# Patient Record
Sex: FEMALE | Race: WHITE | NOT HISPANIC OR LATINO | URBAN - METROPOLITAN AREA
[De-identification: names, ages, dates, MRNs, and addresses within clinical notes are randomized per-mention and may not be internally consistent; named-entity substitution may affect disease eponyms.]

---

## 2022-10-10 ENCOUNTER — DOCUMENTATION (OUTPATIENT)
Dept: PSYCHIATRY | Facility: CLINIC | Age: 57
End: 2022-10-10

## 2022-10-26 ENCOUNTER — OFFICE VISIT (OUTPATIENT)
Dept: PSYCHIATRY | Facility: CLINIC | Age: 57
End: 2022-10-26

## 2022-10-26 DIAGNOSIS — F41.1 GAD (GENERALIZED ANXIETY DISORDER): ICD-10-CM

## 2022-10-26 DIAGNOSIS — F31.9 BIPOLAR DISORDER WITH DEPRESSION (HCC): Primary | ICD-10-CM

## 2022-10-26 DIAGNOSIS — Z63.4 BEREAVEMENT WITHOUT COMPLICATION: ICD-10-CM

## 2022-10-26 RX ORDER — CARBAMAZEPINE 200 MG/1
200 TABLET ORAL 2 TIMES DAILY
Qty: 180 TABLET | Refills: 0 | Status: SHIPPED | OUTPATIENT
Start: 2022-10-26

## 2022-10-26 RX ORDER — CLONAZEPAM 0.5 MG/1
0.5 TABLET ORAL 3 TIMES DAILY
Qty: 90 TABLET | Refills: 0 | Status: SHIPPED | OUTPATIENT
Start: 2022-10-26

## 2022-10-26 RX ORDER — ARIPIPRAZOLE 30 MG/1
30 TABLET ORAL DAILY
Qty: 90 TABLET | Refills: 0 | Status: SHIPPED | OUTPATIENT
Start: 2022-10-26

## 2022-10-26 RX ORDER — TEMAZEPAM 30 MG/1
30 CAPSULE ORAL
Qty: 30 CAPSULE | Refills: 0 | Status: SHIPPED | OUTPATIENT
Start: 2022-10-26

## 2022-10-26 RX ORDER — MIRTAZAPINE 30 MG/1
30 TABLET, FILM COATED ORAL
Qty: 90 TABLET | Refills: 0 | Status: SHIPPED | OUTPATIENT
Start: 2022-10-26

## 2022-10-26 RX ORDER — DOXEPIN HYDROCHLORIDE 100 MG/1
CAPSULE ORAL
Qty: 180 CAPSULE | Refills: 1 | Status: SHIPPED | OUTPATIENT
Start: 2022-10-26

## 2022-10-26 SDOH — SOCIAL STABILITY - SOCIAL INSECURITY: DISSAPEARANCE AND DEATH OF FAMILY MEMBER: Z63.4

## 2022-10-26 NOTE — PSYCH
Psychiatric Medication Management - [address] [facility] 61 y o  female MRN: [id number]          This note was not shared with the patient due to reasonable likelihood of causing patient harm     Reason for Visit: depression, anxiety and insomnia    Subjective: Former pt of Dr Ulises Echols and treated for Bipolar Delmi, anxiety and insomnia  Depression since 1984 and anxiety " all my life " Pt reports stable mood with good energy and good motivation  Enjoys going to [employer] and active in her [place]  She also enjoys crocheting, shopping and playing with her cat  Denies SI/HI  Anxiety is mild and manageable  Tolerates medications well  Denies elevated mood      view Of Systems:     Constitutional Negative   ENT Negative    Cardiovascular High cholesterol   Respiratory Emphysema   Gastrointestinal Negative   Genitourinary Overactive bladder   Musculoskeletal Negative   Integumentary Negative   Neurological headaches   Endocrine IDDM type 2       Allergies: PCN, Amoxicillin, Benadryl    Past Psychiatric History: Dr Ulises Echols    Family Psychiatric History: anxiety, bipolar delmi    Social History: , lost 28 yo son 3 years ago    Substance Abuse History: pt denies     Traumatic History: loss of son 3 years ago    The following portions of the patient's history were reviewed and updated as appropriate: past family history, past medical history, past social history, past surgical history and problem list       Mental status:  Appearance Casually dressed   Mood stable   Affect pleasant   Speech WNL   Thought Processes WNL   Associations intact   Hallucinations Denies any auditory or visual hallucinations   Thought Content No passive or active suicidal or homicidal ideation, intent, or plan   Orientation Oriented to person, place, time, and situation   Recent and Remote Memory Grossly intact   Attention Span and Concentration intact   Intellect Appears to be of Average Intelligence   Insight intact   Judgement judgment was intact   Muscle Strength Normal gait   Language Within normal limits   Fund of Knowledge Age appropriate   Pain NA            ? Assessment/Plan: stable mood/ continue current medications: Abilify 30 mg daily  Tegretol 200 mg bid, Doxepin 200 mg hs, Klonopin 0 5 mg tid prn, Remeron 30 mg hs and Restoril 30 mg hs prn     Diagnosis: Bipolar delmi, depressed, PABLO, Bereavement    Risks, Benefits And Possible Side Effects Of Medications:  Risks, benefits, and possible side effects of medications explained to patient and family, they verbalize understanding    Controlled Medication Discussion: Discussed with patient Black Box warning on concurrent use of benzodiazepines and opioid medications including sedation, respiratory depression, coma and death  Patient understands the risk of treatment with benzodiazepines in addition to opioids and wants to continue taking those medications  Psychotherapy Provided: Supportive psychotherapy provided  Yes  Reassurance and supportive therapy provided         Visit Time    Visit Start Time: 2:35 pm  Visit Stop Time:  3:00 pm  Total Visit Duration: 25 min

## 2022-11-22 DIAGNOSIS — F41.1 GAD (GENERALIZED ANXIETY DISORDER): ICD-10-CM

## 2022-11-28 DIAGNOSIS — F41.1 GAD (GENERALIZED ANXIETY DISORDER): ICD-10-CM

## 2022-11-28 NOTE — TELEPHONE ENCOUNTER
Medication Refill Request     Name of Medication Temazepam   Dose/Frequency 30 mg 1 x daily   Quantity 30  Verified pharmacy   [x]  Verified ordering Provider   [x]  Does patient have enough for the next 3 days? Yes [] No [x]  Does patient have a follow-up appointment scheduled?  Yes [x] No []   If so when is appointment: 12/13/22

## 2022-11-30 RX ORDER — TEMAZEPAM 30 MG/1
30 CAPSULE ORAL
Qty: 30 CAPSULE | Refills: 0 | Status: SHIPPED | OUTPATIENT
Start: 2022-11-30 | End: 2022-12-11 | Stop reason: SDUPTHER

## 2022-12-06 RX ORDER — CLONAZEPAM 0.5 MG/1
TABLET ORAL
Qty: 90 TABLET | Refills: 0 | Status: SHIPPED | OUTPATIENT
Start: 2022-12-06 | End: 2022-12-13 | Stop reason: SDUPTHER

## 2022-12-13 ENCOUNTER — OFFICE VISIT (OUTPATIENT)
Dept: PSYCHIATRY | Facility: CLINIC | Age: 57
End: 2022-12-13

## 2022-12-13 DIAGNOSIS — F31.9 BIPOLAR DISORDER WITH DEPRESSION (HCC): ICD-10-CM

## 2022-12-13 DIAGNOSIS — Z63.4 BEREAVEMENT WITHOUT COMPLICATION: Primary | ICD-10-CM

## 2022-12-13 DIAGNOSIS — F41.1 GAD (GENERALIZED ANXIETY DISORDER): ICD-10-CM

## 2022-12-13 RX ORDER — DOXEPIN HYDROCHLORIDE 100 MG/1
CAPSULE ORAL
Qty: 180 CAPSULE | Refills: 0 | Status: SHIPPED | OUTPATIENT
Start: 2022-12-13

## 2022-12-13 RX ORDER — TEMAZEPAM 30 MG/1
30 CAPSULE ORAL
Qty: 30 CAPSULE | Refills: 1 | Status: SHIPPED | OUTPATIENT
Start: 2022-12-25

## 2022-12-13 RX ORDER — CARBAMAZEPINE 200 MG/1
200 TABLET ORAL 2 TIMES DAILY
Qty: 180 TABLET | Refills: 0 | Status: SHIPPED | OUTPATIENT
Start: 2022-12-13

## 2022-12-13 RX ORDER — CLONAZEPAM 0.5 MG/1
0.5 TABLET ORAL 3 TIMES DAILY
Qty: 90 TABLET | Refills: 1 | Status: SHIPPED | OUTPATIENT
Start: 2022-12-31

## 2022-12-13 RX ORDER — ARIPIPRAZOLE 30 MG/1
30 TABLET ORAL DAILY
Qty: 90 TABLET | Refills: 0 | Status: SHIPPED | OUTPATIENT
Start: 2022-12-13

## 2022-12-13 RX ORDER — MIRTAZAPINE 30 MG/1
30 TABLET, FILM COATED ORAL
Qty: 90 TABLET | Refills: 0 | Status: SHIPPED | OUTPATIENT
Start: 2022-12-13

## 2022-12-13 SDOH — SOCIAL STABILITY - SOCIAL INSECURITY: DISSAPEARANCE AND DEATH OF FAMILY MEMBER: Z63.4

## 2022-12-13 NOTE — PSYCH
Psychiatric Medication Management - [address] [facility] 61 y o  female MRN: [id number]          This note was not shared with the patient due to reasonable likelihood of causing patient harm     Reason for Visit: depression, anxiety and insomnia    Subjective: Holiday season difficult for pt since she misses family members who passed away ( mother, son)  Father lives in [place]  Spent Thanksgiving with , good friend and a neighbor  Became tearful speaking about  family member  Former pt of Dr Bruce Cook and treated for Bipolar Delmi, anxiety and insomnia  Pt reports stable mood with good energy and good motivation  " I feel fine " " I go to [place] every  " Enjoys going to [employer] and active in her [place]  She also enjoys crocheting, shopping and playing with her cat  Denies SI/HI  Anxiety is mild and manageable  Tolerates medications well  Denies elevated mood  Overall stable       view Of Systems:     Constitutional Negative   ENT Negative    Cardiovascular High cholesterol   Respiratory Emphysema   Gastrointestinal Negative   Genitourinary Overactive bladder   Musculoskeletal Negative   Integumentary Negative   Neurological headaches   Endocrine IDDM type 2       Allergies: PCN, Amoxicillin, Benadryl    Past Psychiatric History: Dr Bruce Cook    Family Psychiatric History: anxiety, bipolar delmi    Social History: , lost 30 yo son 3 years ago    Substance Abuse History: pt denies     Traumatic History: loss of son 3 years ago    The following portions of the patient's history were reviewed and updated as appropriate: past family history, past medical history, past social history, past surgical history and problem list       Mental status:  Appearance Casually dressed   Mood Sadness, situational   Affect pleasant   Speech WNL   Thought Processes WNL   Associations intact   Hallucinations Denies any auditory or visual hallucinations   Thought Content No passive or active suicidal or homicidal ideation, intent, or plan   Orientation Oriented to person, place, time, and situation   Recent and Remote Memory Grossly intact   Attention Span and Concentration intact   Intellect Appears to be of Average Intelligence   Insight intact   Judgement judgment was intact   Muscle Strength Normal gait   Language Within normal limits   Fund of Knowledge Age appropriate   Pain NA            ? Assessment/Plan: stable mood/ continue current medications: Abilify 30 mg daily, Tegretol 200 mg bid, Doxepin 200 mg hs, Klonopin 0 5 mg tid prn, Remeron 30 mg hs and Restoril 30 mg hs prn  Follow up in 8 weeks  Diagnosis: Bipolar delmi, depressed, PABLO, Bereavement    Risks, Benefits And Possible Side Effects Of Medications:  Risks, benefits, and possible side effects of medications explained to patient and family, they verbalize understanding    Controlled Medication Discussion: Discussed with patient Black Box warning on concurrent use of benzodiazepines and opioid medications including sedation, respiratory depression, coma and death  Patient understands the risk of treatment with benzodiazepines in addition to opioids and wants to continue taking those medications  Psychotherapy Provided: Supportive psychotherapy provided  Yes  Reassurance and supportive therapy provided         Visit Time    Visit Start Time: 13:20  Visit Stop Time:  13:40  Total Visit Duration: 20 min

## 2023-02-07 ENCOUNTER — OFFICE VISIT (OUTPATIENT)
Dept: PSYCHIATRY | Facility: CLINIC | Age: 58
End: 2023-02-07

## 2023-02-07 DIAGNOSIS — F31.9 BIPOLAR DISORDER WITH DEPRESSION (HCC): Primary | ICD-10-CM

## 2023-02-07 DIAGNOSIS — F41.1 GAD (GENERALIZED ANXIETY DISORDER): ICD-10-CM

## 2023-02-07 DIAGNOSIS — Z63.4 BEREAVEMENT WITHOUT COMPLICATION: ICD-10-CM

## 2023-02-07 RX ORDER — CLONAZEPAM 0.5 MG/1
0.5 TABLET ORAL 3 TIMES DAILY
Qty: 90 TABLET | Refills: 2 | Status: SHIPPED | OUTPATIENT
Start: 2023-02-15

## 2023-02-07 RX ORDER — CARBAMAZEPINE 200 MG/1
200 TABLET ORAL 2 TIMES DAILY
Qty: 180 TABLET | Refills: 0 | Status: SHIPPED | OUTPATIENT
Start: 2023-02-07

## 2023-02-07 RX ORDER — DOXEPIN HYDROCHLORIDE 100 MG/1
CAPSULE ORAL
Qty: 180 CAPSULE | Refills: 0 | Status: SHIPPED | OUTPATIENT
Start: 2023-02-07

## 2023-02-07 RX ORDER — ARIPIPRAZOLE 30 MG/1
30 TABLET ORAL DAILY
Qty: 90 TABLET | Refills: 0 | Status: SHIPPED | OUTPATIENT
Start: 2023-02-07

## 2023-02-07 RX ORDER — TEMAZEPAM 30 MG/1
30 CAPSULE ORAL
Qty: 30 CAPSULE | Refills: 2 | Status: SHIPPED | OUTPATIENT
Start: 2023-02-24

## 2023-02-07 RX ORDER — MIRTAZAPINE 30 MG/1
30 TABLET, FILM COATED ORAL
Qty: 90 TABLET | Refills: 0 | Status: SHIPPED | OUTPATIENT
Start: 2023-02-07

## 2023-02-07 SDOH — SOCIAL STABILITY - SOCIAL INSECURITY: DISSAPEARANCE AND DEATH OF FAMILY MEMBER: Z63.4

## 2023-02-07 NOTE — PSYCH
Psychiatric Medication Management - [address] [facility] 61 y o  female MRN: [id number]          This note was not shared with the patient due to reasonable likelihood of causing patient harm     Reason for Visit: depression, anxiety and insomnia    Subjective: Former pt of Dr Keith Thompson and treated for Bipolar Delmi, anxiety and insomnia  Pt reports stable mood  She denies depression or anxiety symptoms  Pt says: " I feel fine " Sleep is reported as stable  Attends [employer]  Goes to [place] every Sunday  She also enjoys crocheting, shopping and playing with her cat  Denies SI/HI  Anxiety is mild and manageable  Tolerates medications well  Denies elevated mood  Overall stable       view Of Systems:     Constitutional Negative   ENT Negative    Cardiovascular High cholesterol   Respiratory Emphysema   Gastrointestinal Negative   Genitourinary Overactive bladder   Musculoskeletal Negative   Integumentary Negative   Neurological headaches   Endocrine IDDM type 2       Allergies: PCN, Amoxicillin, Benadryl    Past Psychiatric History: Dr Keith Thompson    Family Psychiatric History: anxiety, bipolar delmi    Social History: , lost 28 yo son 3 years ago    Substance Abuse History: pt denies     Traumatic History: loss of son 3 years ago    The following portions of the patient's history were reviewed and updated as appropriate: past family history, past medical history, past social history, past surgical history and problem list       Mental status:  Appearance Casually dressed   Mood stable   Affect pleasant   Speech WNL   Thought Processes WNL   Associations intact   Hallucinations Denies any auditory or visual hallucinations   Thought Content No passive or active suicidal or homicidal ideation, intent, or plan   Orientation Oriented to person, place, time, and situation   Recent and Remote Memory Grossly intact   Attention Span and Concentration intact   Intellect Appears to be of Average Intelligence Insight intact   Judgement judgment was intact   Muscle Strength Normal gait   Language Within normal limits   Fund of Knowledge Age appropriate   Pain NA            ? Asessment/Plan: stable mood/ continue current medications: Abilify 30 mg daily, Tegretol 200 mg bid, Doxepin 200 mg hs, Klonopin 0 5 mg tid prn, Remeron 30 mg hs and Restoril 30 mg hs prn  Tegretol level, CBC with diff and Liver profile ordered today  Follow up in 12 weeks  Diagnosis: Bipolar delmi, depressed, PABLO, Bereavement    Risks, Benefits And Possible Side Effects Of Medications:  Risks, benefits, and possible side effects of medications explained to patient and family, they verbalize understanding    Controlled Medication Discussion: Discussed with patient Black Box warning on concurrent use of benzodiazepines and opioid medications including sedation, respiratory depression, coma and death  Patient understands the risk of treatment with benzodiazepines in addition to opioids and wants to continue taking those medications  Psychotherapy Provided: Supportive psychotherapy provided  Yes  Reassurance and supportive therapy provided         Visit Time    Visit Start Time: 12:56  Visit Stop Time:  13:06  Total Visit Duration: 10 min

## 2023-05-01 ENCOUNTER — TELEPHONE (OUTPATIENT)
Dept: PSYCHIATRY | Facility: CLINIC | Age: 58
End: 2023-05-01

## 2023-05-01 ENCOUNTER — DOCUMENTATION (OUTPATIENT)
Dept: PSYCHIATRY | Facility: CLINIC | Age: 58
End: 2023-05-01

## 2023-05-08 ENCOUNTER — OFFICE VISIT (OUTPATIENT)
Dept: PSYCHIATRY | Facility: CLINIC | Age: 58
End: 2023-05-08

## 2023-05-08 DIAGNOSIS — F41.1 GAD (GENERALIZED ANXIETY DISORDER): ICD-10-CM

## 2023-05-08 DIAGNOSIS — F31.9 BIPOLAR 1 DISORDER, DEPRESSED (HCC): Primary | ICD-10-CM

## 2023-05-08 DIAGNOSIS — Z63.4 BEREAVEMENT WITHOUT COMPLICATION: ICD-10-CM

## 2023-05-08 RX ORDER — CARBAMAZEPINE 200 MG/1
200 TABLET ORAL 2 TIMES DAILY
Qty: 180 TABLET | Refills: 0 | Status: SHIPPED | OUTPATIENT
Start: 2023-05-08

## 2023-05-08 RX ORDER — DOXEPIN HYDROCHLORIDE 100 MG/1
CAPSULE ORAL
Qty: 180 CAPSULE | Refills: 0 | Status: SHIPPED | OUTPATIENT
Start: 2023-05-08

## 2023-05-08 RX ORDER — MIRTAZAPINE 30 MG/1
30 TABLET, FILM COATED ORAL
Qty: 90 TABLET | Refills: 0 | Status: SHIPPED | OUTPATIENT
Start: 2023-05-08

## 2023-05-08 RX ORDER — CLONAZEPAM 0.5 MG/1
0.5 TABLET ORAL 3 TIMES DAILY
Qty: 90 TABLET | Refills: 2 | Status: SHIPPED | OUTPATIENT
Start: 2023-05-08

## 2023-05-08 RX ORDER — ARIPIPRAZOLE 30 MG/1
30 TABLET ORAL DAILY
Qty: 90 TABLET | Refills: 0 | Status: SHIPPED | OUTPATIENT
Start: 2023-05-08

## 2023-05-08 RX ORDER — TEMAZEPAM 30 MG/1
30 CAPSULE ORAL
Qty: 30 CAPSULE | Refills: 2 | Status: SHIPPED | OUTPATIENT
Start: 2023-05-27

## 2023-05-08 SDOH — SOCIAL STABILITY - SOCIAL INSECURITY: DISSAPEARANCE AND DEATH OF FAMILY MEMBER: Z63.4

## 2023-05-08 NOTE — PSYCH
"Psychiatric Medication Management - [address] [facility] 60 y o  female MRN: [id number]          This note was not shared with the patient due to reasonable likelihood of causing patient harm     Reason for Visit: depression, anxiety and insomnia    Subjective: Former pt of Dr Sindi Cooper and treated for Bipolar Ozzie, anxiety and insomnia with Abilify 30 mg daily, Tegretol 200 mg bid, Doxepin 200 mg hs, Klonopin 0 5 mg tid prn, Remeron 30 mg hs and Restoril 30 mg hs prn  Pt tolerates medications well with no evidence or reports of EPS/TD  Pt reports stable mood  She denies depression or anxiety symptoms  Broken sleep at times  \" I try to walk every day  \" Has plans to join [employer]  Goes to [employer] every Sunday  She also enjoys crocheting, shopping and playing with her cat  Denies SI/HI  Anxiety is mild and manageable  Tolerates medications well  Denies elevated mood  Overall stable       view Of Systems:     Constitutional Negative   ENT Negative    Cardiovascular High cholesterol   Respiratory Emphysema   Gastrointestinal Negative   Genitourinary Overactive bladder   Musculoskeletal Negative   Integumentary Negative   Neurological headaches   Endocrine IDDM type 2       Allergies: PCN, Amoxicillin, Benadryl    Past Psychiatric History: Dr Sindi Cooper    Family Psychiatric History: anxiety, bipolar ozzie    Social History: , lost 28 yo son 3 years ago    Substance Abuse History: pt denies     Traumatic History: loss of son 3 years ago    The following portions of the patient's history were reviewed and updated as appropriate: past family history, past medical history, past social history, past surgical history and problem list       Mental status:  Appearance Casually dressed   Mood stable   Affect pleasant   Speech WNL   Thought Processes WNL   Associations intact   Hallucinations Denies any auditory or visual hallucinations   Thought Content No passive or active suicidal or " homicidal ideation, intent, or plan   Orientation Oriented to person, place, time, and situation   Recent and Remote Memory Grossly intact   Attention Span and Concentration intact   Intellect Appears to be of Average Intelligence   Insight intact   Judgement judgment was intact   Muscle Strength Normal gait   Language Within normal limits   Fund of Knowledge Age appropriate   Pain NA            ? Asessment/Plan: stable mood/ continue current medications: Abilify 30 mg daily, Tegretol 200 mg bid, Doxepin 200 mg hs, Klonopin 0 5 mg tid prn, Remeron 30 mg hs and Restoril 30 mg hs prn  Pt declines individual therapy to discuss son's death 3 years ago  Pt has not completed lab work and will order Tegretol level, CBC with diff and Liver profile again today  Pt aware this prescriber leaving and she will follow up with Mari Storey in 12 weeks  Diagnosis: Bipolar delmi, depressed, PABLO, Bereavement    Risks, Benefits And Possible Side Effects Of Medications:  Risks, benefits, and possible side effects of medications explained to patient and family, they verbalize understanding    Controlled Medication Discussion: Discussed with patient Black Box warning on concurrent use of benzodiazepines and opioid medications including sedation, respiratory depression, coma and death  Patient understands the risk of treatment with benzodiazepines in addition to opioids and wants to continue taking those medications  Psychotherapy Provided: Supportive psychotherapy provided  Yes  Reassurance and supportive therapy provided         Visit Time    Visit Start Time: 12:56  Visit Stop Time:  13:10  Total Visit Duration: 14 min

## 2023-07-31 ENCOUNTER — OFFICE VISIT (OUTPATIENT)
Dept: PSYCHIATRY | Facility: CLINIC | Age: 58
End: 2023-07-31
Payer: COMMERCIAL

## 2023-07-31 DIAGNOSIS — F31.9 BIPOLAR 1 DISORDER, DEPRESSED (HCC): ICD-10-CM

## 2023-07-31 DIAGNOSIS — F41.1 GAD (GENERALIZED ANXIETY DISORDER): ICD-10-CM

## 2023-07-31 PROCEDURE — 99212 OFFICE O/P EST SF 10 MIN: CPT

## 2023-07-31 RX ORDER — DOXEPIN HYDROCHLORIDE 100 MG/1
CAPSULE ORAL
Qty: 180 CAPSULE | Refills: 0 | Status: SHIPPED | OUTPATIENT
Start: 2023-07-31

## 2023-07-31 RX ORDER — CLONAZEPAM 0.5 MG/1
0.5 TABLET ORAL 3 TIMES DAILY
Qty: 90 TABLET | Refills: 2 | Status: SHIPPED | OUTPATIENT
Start: 2023-07-31

## 2023-07-31 RX ORDER — ARIPIPRAZOLE 30 MG/1
30 TABLET ORAL DAILY
Qty: 90 TABLET | Refills: 0 | Status: SHIPPED | OUTPATIENT
Start: 2023-07-31

## 2023-07-31 RX ORDER — TEMAZEPAM 30 MG/1
30 CAPSULE ORAL
Qty: 30 CAPSULE | Refills: 2 | Status: SHIPPED | OUTPATIENT
Start: 2023-07-31

## 2023-07-31 RX ORDER — CARBAMAZEPINE 200 MG/1
200 TABLET ORAL 2 TIMES DAILY
Qty: 180 TABLET | Refills: 0 | Status: SHIPPED | OUTPATIENT
Start: 2023-07-31

## 2023-07-31 RX ORDER — MIRTAZAPINE 30 MG/1
30 TABLET, FILM COATED ORAL
Qty: 90 TABLET | Refills: 0 | Status: SHIPPED | OUTPATIENT
Start: 2023-07-31

## 2023-07-31 NOTE — PSYCH
Psychiatric Medication Management - [address] 60 y.o. female MRN: [id number]          This note was not shared with the patient due to reasonable likelihood of causing patient harm     Reason for Visit: Medication management    Subjective: Former patient of Tamar Carranza is being treated for Bipolar Delmi, anxiety and insomnia. Patient is observed on Abilify 30 mg daily, Tegretol 200 mg bid, Doxepin 200 mg hs, Klonopin 0.5 mg tid prn, Remeron 30 mg hs and Restoril 30 mg hs prn. Patient has been compliant with her medications and denies any side effects including EPS/TD. Patient rates both her depression and anxiety a 1/10. Patient reports she was admitted at [facility] for uncontrolled blood sugars for 1 week. Patient reports blood sugars have been controlled. Mood has been stable. Sleep and appetite has been stable. Energy and motivation has been good. Patient enjoys taking walks, going to [employer], and crocheting baby blankets. Patient denies SI/HI, AV/VH, elevated moods, and paranoia.          Review Of Systems:     Constitutional Negative   ENT Negative    Cardiovascular High cholesterol   Respiratory Emphysema   Gastrointestinal Negative   Genitourinary Overactive bladder   Musculoskeletal Negative   Integumentary Negative   Neurological headaches   Endocrine IDDM type 2       Allergies: PCN, Amoxicillin, Benadryl    Past Psychiatric History: TRIST[facility]oya at [address] History: anxiety, bipolar delmi    Social History: , lost 28 yo son 3 years ago    Substance Abuse History: pt denies     Traumatic History: loss of son 3 years ago    The following portions of the patient's history were reviewed and updated as appropriate: past family history, past medical history, past social history, past surgical history and problem list.      Mental status:  Appearance Casually dressed   Mood stable   Affect pleasant   Speech WNL   Thought Processes WNL   Associations intact   Hallucinations Denies any auditory or visual hallucinations   Thought Content No passive or active suicidal or homicidal ideation, intent, or plan   Orientation Oriented to person, place, time, and situation   Recent and Remote Memory Grossly intact   Attention Span and Concentration intact   Intellect Appears to be of Average Intelligence   Insight intact   Judgement judgment was intact   Muscle Strength Normal gait   Language Within normal limits   Fund of Knowledge Age appropriate   Pain NA            Asessment/Plan:   1. Continue Abilify 30 mg daily  2. Continue Tegretol 200 mg bid  3. Continue Doxepin 200 mg hs  4. Continue Klonopin 0.5 mg tid prn  5. Continue Remeron 30 mg hs   6. Continue Restoril 30 mg hs prn.  7. Patient declines individual therapy to discuss son's death 3 years ago  6. Call if any adverse medication side effects  9. Follow up in 3 months     Diagnosis: Bipolar delmi, depressed, PABLO, Bereavement    Risks, Benefits And Possible Side Effects Of Medications:  Risks, benefits, and possible side effects of medications explained to patient and family, they verbalize understanding    Controlled Medication Discussion: Discussed with patient Black Box warning on concurrent use of benzodiazepines and opioid medications including sedation, respiratory depression, coma and death. Patient understands the risk of treatment with benzodiazepines in addition to opioids and wants to continue taking those medications. Psychotherapy Provided: Supportive psychotherapy provided. Yes  Reassurance and supportive therapy provided.        Visit Time    Visit Start Time: 3:00 PM  Visit Stop Time:  3:15 PM  Total Visit Duration: 15 min

## 2023-10-30 ENCOUNTER — OFFICE VISIT (OUTPATIENT)
Dept: PSYCHIATRY | Facility: CLINIC | Age: 58
End: 2023-10-30
Payer: COMMERCIAL

## 2023-10-30 DIAGNOSIS — F41.1 GAD (GENERALIZED ANXIETY DISORDER): ICD-10-CM

## 2023-10-30 DIAGNOSIS — F31.9 BIPOLAR 1 DISORDER, DEPRESSED (HCC): ICD-10-CM

## 2023-10-30 PROCEDURE — 99214 OFFICE O/P EST MOD 30 MIN: CPT

## 2023-10-30 RX ORDER — TEMAZEPAM 30 MG/1
30 CAPSULE ORAL
Qty: 30 CAPSULE | Refills: 2 | Status: SHIPPED | OUTPATIENT
Start: 2023-10-30

## 2023-10-30 RX ORDER — ARIPIPRAZOLE 30 MG/1
30 TABLET ORAL DAILY
Qty: 90 TABLET | Refills: 0 | Status: SHIPPED | OUTPATIENT
Start: 2023-10-30

## 2023-10-30 RX ORDER — MIRTAZAPINE 30 MG/1
30 TABLET, FILM COATED ORAL
Qty: 90 TABLET | Refills: 0 | Status: SHIPPED | OUTPATIENT
Start: 2023-10-30

## 2023-10-30 RX ORDER — DOXEPIN HYDROCHLORIDE 100 MG/1
CAPSULE ORAL
Qty: 180 CAPSULE | Refills: 0 | Status: SHIPPED | OUTPATIENT
Start: 2023-10-30

## 2023-10-30 RX ORDER — CARBAMAZEPINE 200 MG/1
200 TABLET ORAL 2 TIMES DAILY
Qty: 180 TABLET | Refills: 0 | Status: SHIPPED | OUTPATIENT
Start: 2023-10-30

## 2023-10-30 RX ORDER — CLONAZEPAM 0.5 MG/1
0.5 TABLET ORAL 3 TIMES DAILY
Qty: 90 TABLET | Refills: 2 | Status: SHIPPED | OUTPATIENT
Start: 2023-10-30

## 2023-10-30 NOTE — PSYCH
Psychiatric Medication Management - [address] 60 y.o. female MRN: [id number]          This note was not shared with the patient due to reasonable likelihood of causing patient harm     Reason for Visit: Medication management    Subjective:   Patient is being treated for Bipolar Delmi, anxiety and insomnia. Patient is observed on Abilify 30 mg daily, Tegretol 200 mg bid, Doxepin 200 mg hs, Klonopin 0.5 mg tid prn, Remeron 30 mg hs and Restoril 30 mg hs prn. Patient tolerates the medications well, has been compliant and denies any side effects including EPS/TD. Patient denies dysphoric moods including depression and anxiety. Patient reports doing well since last appointment and has been keeping busy with [employer] and crocheting baby blankets. Sleep and appetite has been stable. Good energy and motivation. Blood sugars have continued to remain stable and under control. Patient SI/HI, AV/VH, elevated moods, and paranoia.          Review Of Systems:     Constitutional Negative   ENT Negative    Cardiovascular High cholesterol   Respiratory Emphysema   Gastrointestinal Negative   Genitourinary Overactive bladder   Musculoskeletal Negative   Integumentary Negative   Neurological headaches   Endocrine IDDM type 2       Allergies: PCN, Amoxicillin, Benadryl    Past Psychiatric History: TRISTIAN Paul at [address] History: anxiety, bipolar delmi    Social History: , lost 30 yo son 3 years ago    Substance Abuse History: pt denies     Traumatic History: loss of son 3 years ago    The following portions of the patient's history were reviewed and updated as appropriate: past family history, past medical history, past social history, past surgical history and problem list.      Mental status:  Appearance Casually dressed   Mood stable   Affect pleasant   Speech WNL   Thought Processes WNL   Associations intact   Hallucinations Denies any auditory or visual hallucinations   Thought Content No passive or active suicidal or homicidal ideation, intent, or plan   Orientation Oriented to person, place, time, and situation   Recent and Remote Memory Grossly intact   Attention Span and Concentration intact   Intellect Appears to be of Average Intelligence   Insight intact   Judgement judgment was intact   Muscle Strength Normal gait   Language Within normal limits   Fund of Knowledge Age appropriate   Pain NA            Asessment/Plan:   1. Continue Abilify 30 mg daily  2. Continue Tegretol 200 mg bid  3. Continue Doxepin 200 mg hs  4. Continue Klonopin 0.5 mg tid prn  5. Continue Remeron 30 mg hs   6. Continue Restoril 30 mg hs prn.  7. Patient declines individual therapy to discuss son's death 3 years ago  6. Call if any adverse medication side effects  9. Follow up in 3 months     Diagnosis: Bipolar delmi, depressed, PABLO, Bereavement    Risks, Benefits And Possible Side Effects Of Medications:  Risks, benefits, and possible side effects of medications explained to patient and family, they verbalize understanding    Controlled Medication Discussion: Discussed with patient Black Box warning on concurrent use of benzodiazepines and opioid medications including sedation, respiratory depression, coma and death. Patient understands the risk of treatment with benzodiazepines in addition to opioids and wants to continue taking those medications. Psychotherapy Provided: Supportive psychotherapy provided. Yes  Reassurance and supportive therapy provided.        Visit Time    Visit Start Time: 3:00 PM  Visit Stop Time:  3:30 PM  Total Visit Duration: 30 min

## 2024-01-16 ENCOUNTER — TELEPHONE (OUTPATIENT)
Dept: PSYCHIATRY | Facility: CLINIC | Age: 59
End: 2024-01-16

## 2024-01-16 NOTE — TELEPHONE ENCOUNTER
Contacted client about MA terming 11/30/2023. Client stated she has new insurance. Caller asked for new ins information and she stated she does not remember off the top of her head and is not able to share the information at that moment. Client stated she will bring ins card with her to appointment.

## 2024-01-23 ENCOUNTER — OFFICE VISIT (OUTPATIENT)
Dept: PSYCHIATRY | Facility: CLINIC | Age: 59
End: 2024-01-23
Payer: COMMERCIAL

## 2024-01-23 DIAGNOSIS — F31.9 BIPOLAR 1 DISORDER, DEPRESSED (HCC): ICD-10-CM

## 2024-01-23 DIAGNOSIS — F41.1 GAD (GENERALIZED ANXIETY DISORDER): ICD-10-CM

## 2024-01-23 PROCEDURE — 99212 OFFICE O/P EST SF 10 MIN: CPT

## 2024-01-23 RX ORDER — DOXEPIN HYDROCHLORIDE 100 MG/1
CAPSULE ORAL
Qty: 180 CAPSULE | Refills: 0 | Status: SHIPPED | OUTPATIENT
Start: 2024-01-23

## 2024-01-23 RX ORDER — ARIPIPRAZOLE 30 MG/1
30 TABLET ORAL DAILY
Qty: 30 TABLET | Refills: 2 | OUTPATIENT
Start: 2024-01-23

## 2024-01-23 RX ORDER — CLONAZEPAM 0.5 MG/1
0.5 TABLET ORAL 3 TIMES DAILY
Qty: 90 TABLET | Refills: 2 | Status: SHIPPED | OUTPATIENT
Start: 2024-01-23

## 2024-01-23 RX ORDER — MIRTAZAPINE 30 MG/1
30 TABLET, FILM COATED ORAL
Qty: 90 TABLET | Refills: 0 | Status: SHIPPED | OUTPATIENT
Start: 2024-01-23

## 2024-01-23 RX ORDER — CARBAMAZEPINE 200 MG/1
200 TABLET ORAL 2 TIMES DAILY
Qty: 180 TABLET | Refills: 0 | Status: SHIPPED | OUTPATIENT
Start: 2024-01-23

## 2024-01-23 RX ORDER — ARIPIPRAZOLE 30 MG/1
30 TABLET ORAL DAILY
Qty: 90 TABLET | Refills: 0 | Status: SHIPPED | OUTPATIENT
Start: 2024-01-23

## 2024-01-23 RX ORDER — TEMAZEPAM 30 MG/1
30 CAPSULE ORAL
Qty: 30 CAPSULE | Refills: 2 | Status: SHIPPED | OUTPATIENT
Start: 2024-01-23

## 2024-01-23 NOTE — PSYCH
Psychiatric Medication Management - Behavioral Health   Mirella Mendoza 58 y.o. female MRN: [id number]          This note was not shared with the patient due to reasonable likelihood of causing patient harm     Reason for Visit: Medication management    Subjective:   Patient is being treated for Bipolar Delmi, anxiety and insomnia. Patient is observed on Abilify 30 mg daily, Tegretol 200 mg bid, Doxepin 200 mg hs, Klonopin 0.5 mg tid prn, Remeron 30 mg hs and Restoril 30 mg hs prn. Patient tolerates the medications well, has been compliant and denies any side effects including EPS/TD. Patient denies dysphoric moods including depression and anxiety. Sleep and appetite remains stable. Good energy and motivation. Diabetes under control. Patient enjoys being involved with her Judaism and crocheting baby blankets. Patient SI/HI, AV/VH, elevated moods, panic attacks and paranoia.       Review Of Systems:     Constitutional Negative   ENT Negative    Cardiovascular High cholesterol   Respiratory Emphysema   Gastrointestinal Negative   Genitourinary Overactive bladder   Musculoskeletal Negative   Integumentary Negative   Neurological headaches   Endocrine IDDM type 2       Allergies: PCN, Amoxicillin, Benadryl    Past Psychiatric History: Dr. Lynn, [facility] at [facility]    Family Psychiatric History: anxiety, bipolar delmi    Social History: , lost 29 yo son 3 years ago    Substance Abuse History: pt denies     Traumatic History: loss of son 3 years ago    The following portions of the patient's history were reviewed and updated as appropriate: past family history, past medical history, past social history, past surgical history and problem list.      Mental status:  Appearance Casually dressed   Mood stable   Affect pleasant   Speech WNL   Thought Processes WNL   Associations intact   Hallucinations Denies any auditory or visual hallucinations   Thought Content No passive or active suicidal or  homicidal ideation, intent, or plan   Orientation Oriented to person, place, time, and situation   Recent and Remote Memory Grossly intact   Attention Span and Concentration intact   Intellect Appears to be of Average Intelligence   Insight intact   Judgement judgment was intact   Muscle Strength Normal gait   Language Within normal limits   Fund of Knowledge Age appropriate   Pain NA            Asessment/Plan:   1. Continue Abilify 30 mg daily  2. Continue Tegretol 200 mg bid  3. Continue Doxepin 200 mg hs  4. Continue Klonopin 0.5 mg tid prn  5. Continue Remeron 30 mg hs   6. Continue Restoril 30 mg hs prn.  7. Patient declines individual therapy to discuss son's death 3 years ago  8. Call if any adverse medication side effects  9. Follow up in 3 months     Diagnosis: Bipolar delmi, depressed, PABLO, Bereavement    Risks, Benefits And Possible Side Effects Of Medications:  Risks, benefits, and possible side effects of medications explained to patient and family, they verbalize understanding    Controlled Medication Discussion: Discussed with patient Black Box warning on concurrent use of benzodiazepines and opioid medications including sedation, respiratory depression, coma and death. Patient understands the risk of treatment with benzodiazepines in addition to opioids and wants to continue taking those medications.      Psychotherapy Provided: Supportive psychotherapy provided.     Yes  Reassurance and supportive therapy provided.       Visit Time    Visit Start Time: 1:00 PM  Visit Stop Time:  1:15 PM  Total Visit Duration: 15 min

## 2024-04-18 ENCOUNTER — OFFICE VISIT (OUTPATIENT)
Dept: PSYCHIATRY | Facility: CLINIC | Age: 59
End: 2024-04-18
Payer: COMMERCIAL

## 2024-04-18 DIAGNOSIS — F41.1 GAD (GENERALIZED ANXIETY DISORDER): ICD-10-CM

## 2024-04-18 DIAGNOSIS — F31.9 BIPOLAR 1 DISORDER, DEPRESSED (HCC): ICD-10-CM

## 2024-04-18 PROCEDURE — 99213 OFFICE O/P EST LOW 20 MIN: CPT

## 2024-04-18 RX ORDER — DOXEPIN HYDROCHLORIDE 100 MG/1
CAPSULE ORAL
Qty: 180 CAPSULE | Refills: 0 | Status: SHIPPED | OUTPATIENT
Start: 2024-04-18

## 2024-04-18 RX ORDER — MIRTAZAPINE 30 MG/1
30 TABLET, FILM COATED ORAL
Qty: 90 TABLET | Refills: 0 | Status: SHIPPED | OUTPATIENT
Start: 2024-04-18

## 2024-04-18 RX ORDER — TEMAZEPAM 30 MG/1
30 CAPSULE ORAL
Qty: 30 CAPSULE | Refills: 2 | Status: SHIPPED | OUTPATIENT
Start: 2024-04-18

## 2024-04-18 RX ORDER — ARIPIPRAZOLE 30 MG/1
30 TABLET ORAL DAILY
Qty: 90 TABLET | Refills: 0 | Status: SHIPPED | OUTPATIENT
Start: 2024-04-18

## 2024-04-18 RX ORDER — CLONAZEPAM 0.5 MG/1
0.5 TABLET ORAL 3 TIMES DAILY
Qty: 90 TABLET | Refills: 2 | Status: SHIPPED | OUTPATIENT
Start: 2024-04-18

## 2024-04-18 RX ORDER — CARBAMAZEPINE 200 MG/1
200 TABLET ORAL 2 TIMES DAILY
Qty: 180 TABLET | Refills: 0 | Status: SHIPPED | OUTPATIENT
Start: 2024-04-18

## 2024-04-18 NOTE — PSYCH
Psychiatric Medication Management - Behavioral Health   Mirella Mendoza 59 y.o. female MRN: [id number]          This note was not shared with the patient due to reasonable likelihood of causing patient harm     Reason for Visit: Medication management    Subjective:   Patient is being treated for Bipolar Delmi, anxiety and insomnia. Patient is observed on Abilify 30 mg daily, Tegretol 200 mg bid, Doxepin 200 mg hs, Klonopin 0.5 mg tid prn, Remeron 30 mg hs and Restoril 30 mg hs prn. Patient tolerates the medications well, has been compliant and denies any side effects including EPS/TD. Patient reports stable mood and denies depression and anxiety. Sleep and appetite remains adequate. Good energy and motivation. Diabetes under control. She enjoys crocheting baby blankets. Patient SI/HI, AV/VH, elevated moods, panic attacks and paranoia.       Review Of Systems:     Constitutional Negative   ENT Negative    Cardiovascular High cholesterol   Respiratory Emphysema   Gastrointestinal Negative   Genitourinary Overactive bladder   Musculoskeletal Negative   Integumentary Negative   Neurological headaches   Endocrine IDDM type 2       Allergies: PCN, Amoxicillin, Benadryl    Past Psychiatric History: Dr. Lynn, [facility] at [facility]    Family Psychiatric History: anxiety, bipolar delmi    Social History: , lost 27 yo son 3 years ago    Substance Abuse History: pt denies     Traumatic History: loss of son 3 years ago    The following portions of the patient's history were reviewed and updated as appropriate: past family history, past medical history, past social history, past surgical history and problem list.      Mental status:  Appearance Casually dressed   Mood stable   Affect pleasant   Speech WNL   Thought Processes WNL   Associations intact   Hallucinations Denies any auditory or visual hallucinations   Thought Content No passive or active suicidal or homicidal ideation, intent, or plan    Orientation Oriented to person, place, time, and situation   Recent and Remote Memory Grossly intact   Attention Span and Concentration intact   Intellect Appears to be of Average Intelligence   Insight intact   Judgement judgment was intact   Muscle Strength Normal gait   Language Within normal limits   Fund of Knowledge Age appropriate   Pain NA            Asessment/Plan:   1. Continue Abilify 30 mg daily  2. Continue Tegretol 200 mg bid  3. Continue Doxepin 200 mg hs  4. Continue Klonopin 0.5 mg tid prn  5. Continue Remeron 30 mg hs   6. Continue Restoril 30 mg hs prn.  7. Follow up in 3 months     Diagnosis: Bipolar delmi, depressed, PABLO, Bereavement    Risks, Benefits And Possible Side Effects Of Medications:  Risks, benefits, and possible side effects of medications explained to patient and family, they verbalize understanding    Controlled Medication Discussion: Discussed with patient Black Box warning on concurrent use of benzodiazepines and opioid medications including sedation, respiratory depression, coma and death. Patient understands the risk of treatment with benzodiazepines in addition to opioids and wants to continue taking those medications.      Psychotherapy Provided: Supportive psychotherapy provided.     Yes  Reassurance and supportive therapy provided.       Visit Time    Visit Start Time: 1:00 PM  Visit Stop Time:  1:20 PM  Total Visit Duration: 20 min

## 2024-05-24 ENCOUNTER — TELEPHONE (OUTPATIENT)
Dept: PSYCHIATRY | Facility: CLINIC | Age: 59
End: 2024-05-24

## 2024-05-24 NOTE — TELEPHONE ENCOUNTER
Writer called the client to inform them that provider Brayan Romano will be virtual only on Fridays. The writer informed the client that they can either switch to virtual or reschedule an in-person appointment for another day in the week.      Client would prefer to keep in person visits and rescheduled for 7/16/24 at 1 pm.

## 2024-06-04 ENCOUNTER — OFFICE VISIT (OUTPATIENT)
Dept: PSYCHIATRY | Facility: CLINIC | Age: 59
End: 2024-06-04
Payer: COMMERCIAL

## 2024-06-04 DIAGNOSIS — F31.9 BIPOLAR DISORDER WITH DEPRESSION (HCC): ICD-10-CM

## 2024-06-04 DIAGNOSIS — F41.1 GAD (GENERALIZED ANXIETY DISORDER): Primary | ICD-10-CM

## 2024-06-04 PROCEDURE — 99213 OFFICE O/P EST LOW 20 MIN: CPT

## 2024-06-04 NOTE — PSYCH
Psychiatric Medication Management - Behavioral Health   Mirella Mendoza 59 y.o. female MRN: [id number]          This note was not shared with the patient due to reasonable likelihood of causing patient harm     Reason for Visit: Medication management    Subjective:   Patient is being treated for Bipolar Delmi, anxiety and insomnia. Patient is observed on Abilify 15 mg daily, Tegretol 200 mg bid, Doxepin 200 mg hs, Klonopin 0.5 mg tid prn, Remeron 30 mg hs and Restoril 30 mg hs prn. Patient tolerates the medications well and has been compliant. Patient called 1 week ago stating her abilify was too sedation and felt tired all morning. We decreased Abilify from 30mg to 15mg and patient reports she is tolerating the lower dose. She reports less sedation. Patient reports stable mood and denies depression and anxiety. Sleep and appetite remains adequate. Good energy and motivation. Diabetes under control. She enjoys crocheting baby blankets. Patient SI/HI, AV/VH, elevated moods, panic attacks and paranoia.       Review Of Systems:     Constitutional Negative   ENT Negative    Cardiovascular High cholesterol   Respiratory Emphysema   Gastrointestinal Negative   Genitourinary Overactive bladder   Musculoskeletal Negative   Integumentary Negative   Neurological headaches   Endocrine IDDM type 2       Allergies: PCN, Amoxicillin, Benadryl    Past Psychiatric History: Dr. Lynn, [facility] Morrison at [facility]    Family Psychiatric History: anxiety, bipolar delmi    Social History: , lost 29 yo son 3 years ago    Substance Abuse History: pt denies     Traumatic History: loss of son 3 years ago    The following portions of the patient's history were reviewed and updated as appropriate: past family history, past medical history, past social history, past surgical history and problem list.      Mental status:  Appearance Casually dressed   Mood stable   Affect pleasant   Speech WNL   Thought Processes WNL    Associations intact   Hallucinations Denies any auditory or visual hallucinations   Thought Content No passive or active suicidal or homicidal ideation, intent, or plan   Orientation Oriented to person, place, time, and situation   Recent and Remote Memory Grossly intact   Attention Span and Concentration intact   Intellect Appears to be of Average Intelligence   Insight intact   Judgement judgment was intact   Muscle Strength Normal gait   Language Within normal limits   Fund of Knowledge Age appropriate   Pain NA            Asessment/Plan:   1. Continue Abilify 15 mg daily  2. Continue Tegretol 200 mg bid  3. Continue Doxepin 200 mg hs  4. Continue Klonopin 0.5 mg tid prn  5. Continue Remeron 30 mg hs   6. Continue Restoril 30 mg hs prn.  7. Follow up in 3 months     Diagnosis: Bipolar delmi, depressed, PABLO, Bereavement    Risks, Benefits And Possible Side Effects Of Medications:  Risks, benefits, and possible side effects of medications explained to patient and family, they verbalize understanding    Controlled Medication Discussion: Discussed with patient Black Box warning on concurrent use of benzodiazepines and opioid medications including sedation, respiratory depression, coma and death. Patient understands the risk of treatment with benzodiazepines in addition to opioids and wants to continue taking those medications.      Psychotherapy Provided: Supportive psychotherapy provided.     Yes  Reassurance and supportive therapy provided.       Visit Time    Visit Start Time: 10:30 AM  Visit Stop Time:  10:50 PM  Total Visit Duration: 20 min

## 2024-07-11 ENCOUNTER — TELEPHONE (OUTPATIENT)
Dept: PSYCHIATRY | Facility: CLINIC | Age: 59
End: 2024-07-11

## 2024-07-11 NOTE — TELEPHONE ENCOUNTER
Client left voicemail regarding finding out if she would need a referral for her appointment 7/18/24 with Brayan Romano. Writer checked with supervisor who looked at client's insurance and said she would not need one.     Writer gave scheduling line should client have any follow up questions.

## 2024-07-18 ENCOUNTER — OFFICE VISIT (OUTPATIENT)
Dept: PSYCHIATRY | Facility: CLINIC | Age: 59
End: 2024-07-18
Payer: COMMERCIAL

## 2024-07-18 DIAGNOSIS — F41.1 GAD (GENERALIZED ANXIETY DISORDER): ICD-10-CM

## 2024-07-18 DIAGNOSIS — F31.9 BIPOLAR 1 DISORDER, DEPRESSED (HCC): ICD-10-CM

## 2024-07-18 PROCEDURE — 99212 OFFICE O/P EST SF 10 MIN: CPT

## 2024-07-18 RX ORDER — CLONAZEPAM 0.5 MG/1
0.5 TABLET ORAL 3 TIMES DAILY
Qty: 90 TABLET | Refills: 2 | Status: SHIPPED | OUTPATIENT
Start: 2024-07-18

## 2024-07-18 RX ORDER — ARIPIPRAZOLE 30 MG/1
30 TABLET ORAL DAILY
Qty: 90 TABLET | Refills: 0 | Status: SHIPPED | OUTPATIENT
Start: 2024-07-18

## 2024-07-18 RX ORDER — TEMAZEPAM 30 MG/1
30 CAPSULE ORAL
Qty: 30 CAPSULE | Refills: 2 | Status: SHIPPED | OUTPATIENT
Start: 2024-07-18

## 2024-07-18 RX ORDER — MIRTAZAPINE 30 MG/1
30 TABLET, FILM COATED ORAL
Qty: 90 TABLET | Refills: 0 | Status: SHIPPED | OUTPATIENT
Start: 2024-07-18

## 2024-07-18 RX ORDER — DOXEPIN HYDROCHLORIDE 100 MG/1
CAPSULE ORAL
Qty: 180 CAPSULE | Refills: 0 | Status: SHIPPED | OUTPATIENT
Start: 2024-07-18

## 2024-07-18 RX ORDER — CARBAMAZEPINE 200 MG/1
200 TABLET ORAL 2 TIMES DAILY
Qty: 180 TABLET | Refills: 0 | Status: SHIPPED | OUTPATIENT
Start: 2024-07-18

## 2024-07-18 NOTE — PSYCH
Psychiatric Medication Management - Behavioral Health   Mirella Mendoza 59 y.o. female MRN: [id number]          This note was not shared with the patient due to reasonable likelihood of causing patient harm     Reason for Visit: Medication management    Subjective:   Patient is being treated for Bipolar Delmi, anxiety and insomnia. Patient is observed on Abilify 15 mg daily, Tegretol 200 mg bid, Doxepin 200 mg hs, Klonopin 0.5 mg tid prn, Remeron 30 mg hs and Restoril 30 mg hs prn. Patient tolerates the medications well, has been compliant and denies any side effects. Patient reports stable mood since last appointment. Patient talks about being upset with her friend, who believes has been talking negatively about patient. Otherwise, she denies depression and anxiety. leep and appetite remains adequate. Good energy and motivation. Diabetes under control. She enjoys crocheting baby blankets. Patient SI/HI, AV/VH, elevated moods, panic attacks and paranoia.    Review Of Systems:     Constitutional Negative   ENT Negative    Cardiovascular High cholesterol   Respiratory Emphysema   Gastrointestinal Negative   Genitourinary Overactive bladder   Musculoskeletal Negative   Integumentary Negative   Neurological headaches   Endocrine IDDM type 2       Allergies: PCN, Amoxicillin, Benadryl    Past Psychiatric History: Dr. Lynn, [facility] at [facility]    Family Psychiatric History: anxiety, bipolar delmi    Social History: , lost 29 yo son 3 years ago    Substance Abuse History: pt denies     Traumatic History: loss of son 3 years ago    The following portions of the patient's history were reviewed and updated as appropriate: past family history, past medical history, past social history, past surgical history and problem list.      Mental status:  Appearance Casually dressed   Mood stable   Affect pleasant   Speech WNL   Thought Processes WNL   Associations intact   Hallucinations Denies any auditory or  visual hallucinations   Thought Content No passive or active suicidal or homicidal ideation, intent, or plan   Orientation Oriented to person, place, time, and situation   Recent and Remote Memory Grossly intact   Attention Span and Concentration intact   Intellect Appears to be of Average Intelligence   Insight intact   Judgement judgment was intact   Muscle Strength Normal gait   Language Within normal limits   Fund of Knowledge Age appropriate   Pain NA            Asessment/Plan:   1. Continue Abilify 15 mg daily  2. Continue Tegretol 200 mg bid  3. Continue Doxepin 200 mg hs  4. Continue Klonopin 0.5 mg tid prn  5. Continue Remeron 30 mg hs   6. Continue Restoril 30 mg hs prn.  7. Follow up in 3 months     Diagnosis: Bipolar delmi, depressed, PABLO, Bereavement    Risks, Benefits And Possible Side Effects Of Medications:  Risks, benefits, and possible side effects of medications explained to patient and family, they verbalize understanding    Controlled Medication Discussion: Discussed with patient Black Box warning on concurrent use of benzodiazepines and opioid medications including sedation, respiratory depression, coma and death. Patient understands the risk of treatment with benzodiazepines in addition to opioids and wants to continue taking those medications.      Psychotherapy Provided: Supportive psychotherapy provided.     Yes  Reassurance and supportive therapy provided.       Visit Time    Visit Start Time: 4:00 PM  Visit Stop Time:  4:15 PM  Total Visit Duration: 15 min

## 2024-10-14 ENCOUNTER — TELEPHONE (OUTPATIENT)
Age: 59
End: 2024-10-14

## 2024-10-14 NOTE — TELEPHONE ENCOUNTER
Patient is calling regarding cancelling an appointment.    Date/Time: 10/16 @ 1pm    Reason: family conflict    Patient was rescheduled: YES [] NO [x]  If yes, when was Patient reschedule for:     Patient requesting call back to reschedule: YES [] NO [x]  Pt will call back to r/s

## 2024-11-03 DIAGNOSIS — F41.1 GAD (GENERALIZED ANXIETY DISORDER): ICD-10-CM

## 2024-11-04 DIAGNOSIS — F31.9 BIPOLAR 1 DISORDER, DEPRESSED (HCC): ICD-10-CM

## 2024-11-04 DIAGNOSIS — F41.1 GAD (GENERALIZED ANXIETY DISORDER): ICD-10-CM

## 2024-11-04 NOTE — TELEPHONE ENCOUNTER
Medication:  PDMP  10/07/2024 07/18/2024 clonazePAM (Tablet) 90.0 30 0.5 MG [facility] Commercial Insurance 1 / 2 [place]   1 [id number] 10/05/2024 07/18/2024 Temazepam (Capsule) 30.0 30 30 MG [facility] Commercial Insurance 2 / 2 PA   1 [id number] 09/10/2024 07/18/2024 clonazePAM (Tablet) 90.0 30 0.5 MG [facility] Commercial Insurance 0 / 2 [place]   1 [id number] 08/31/2024 07/18/2024 Temazepam (Capsule) 30.0 30 30 MG [facility] Commercial Insurance  Active agreement on file -

## 2024-11-04 NOTE — TELEPHONE ENCOUNTER
Medication Refill Request     Name of Medication carBAMazepine (TEGretol)   Dose/Frequency 200 mg   Quantity 180  Verified pharmacy   [x]  Verified ordering Provider   [x]  Does patient have enough for the next 3 days? Yes [] No [x]  Does patient have a follow-up appointment scheduled? Yes [x] No []   If so when is appointment: 11/20 @1:30pm      Medication Refill Request     Name of Medication clonazePAM (KlonoPIN)   Dose/Frequency 0.5 mg   Quantity 90  Verified pharmacy   [x]  Verified ordering Provider   [x]  Does patient have enough for the next 3 days? Yes [] No [x]  Does patient have a follow-up appointment scheduled? Yes [x] No []   If so when is appointment: 11/20 @1:30pm      Medication Refill Request     Name of Medication doxepin (SINEquan)   Dose/Frequency 100 mg   Quantity 180  Verified pharmacy   [x]  Verified ordering Provider   [x]  Does patient have enough for the next 3 days? Yes [] No [x]  Does patient have a follow-up appointment scheduled? Yes [x] No []   If so when is appointment: 11/20 @1:30pm      Medication Refill Request     Name of Medication mirtazapine (REMERON)   Dose/Frequency 30 mg   Quantity 90  Verified pharmacy   [x]  Verified ordering Provider   [x]  Does patient have enough for the next 3 days? Yes [] No [x]  Does patient have a follow-up appointment scheduled? Yes [x] No []   If so when is appointment: 11/20 @1:30pm      Medication Refill Request     Name of Medication temazepam (RESTORIL)   Dose/Frequency 30 mg   Quantity 30  Verified pharmacy   [x]  Verified ordering Provider   [x]  Does patient have enough for the next 3 days? Yes [] No [x]  Does patient have a follow-up appointment scheduled? Yes [x] No []   If so when is appointment: 11/20 @1:30pm      Writer informed patient she has 2 refills left form medication Klonopin 0.5mg and Rstoril 30mg.     Patient shared with writer that she was told by pharmacy that she doesn't have any more refills left.     Patient would like  for someone to call pharmacy and confirm.     Writer informed patient msg will be relay to provider and someone will be in contact with her.

## 2024-11-05 NOTE — TELEPHONE ENCOUNTER
Patient called in stating that she is out of  temazepam (RESTORIL) 30 mg and it's not able to sleep at nights.   Patient had to cancel her previous appts due to a family member passing away. Patient is asking if a courtesy can be provided until she is seen on 11/20. Please review and reach out to Patient to let her know if a refill can be provided.

## 2024-11-06 RX ORDER — CLONAZEPAM 0.5 MG/1
0.5 TABLET ORAL 3 TIMES DAILY
Qty: 90 TABLET | Refills: 2 | Status: SHIPPED | OUTPATIENT
Start: 2024-11-06

## 2024-11-06 RX ORDER — TEMAZEPAM 30 MG/1
30 CAPSULE ORAL
Qty: 30 CAPSULE | Refills: 2 | Status: SHIPPED | OUTPATIENT
Start: 2024-11-06

## 2024-11-06 RX ORDER — CARBAMAZEPINE 200 MG/1
200 TABLET ORAL 2 TIMES DAILY
Qty: 180 TABLET | Refills: 0 | Status: SHIPPED | OUTPATIENT
Start: 2024-11-06

## 2024-11-06 RX ORDER — MIRTAZAPINE 30 MG/1
30 TABLET, FILM COATED ORAL
Qty: 90 TABLET | Refills: 0 | Status: SHIPPED | OUTPATIENT
Start: 2024-11-06

## 2024-11-06 RX ORDER — DOXEPIN HYDROCHLORIDE 100 MG/1
CAPSULE ORAL
Qty: 180 CAPSULE | Refills: 0 | Status: SHIPPED | OUTPATIENT
Start: 2024-11-06

## 2024-11-06 NOTE — TELEPHONE ENCOUNTER
Patient called requesting refill for Temazepam, Mirtazapine, Doxepin, Clonazepam, carbamazepine . Patient made aware medication was refilled on 11/6/24 to [facility] pharmacy. Patient instructed to contact the pharmacy to obtain refills of medication. Patient verbalized understanding.

## 2024-11-20 ENCOUNTER — OFFICE VISIT (OUTPATIENT)
Dept: PSYCHIATRY | Facility: CLINIC | Age: 59
End: 2024-11-20
Payer: COMMERCIAL

## 2024-11-20 DIAGNOSIS — F31.9 BIPOLAR 1 DISORDER, DEPRESSED (HCC): ICD-10-CM

## 2024-11-20 PROCEDURE — 99214 OFFICE O/P EST MOD 30 MIN: CPT

## 2025-01-11 DIAGNOSIS — F41.1 GAD (GENERALIZED ANXIETY DISORDER): ICD-10-CM

## 2025-01-14 RX ORDER — DOXEPIN HYDROCHLORIDE 100 MG/1
200 CAPSULE ORAL
Qty: 180 CAPSULE | Refills: 1 | Status: SHIPPED | OUTPATIENT
Start: 2025-01-14

## 2025-02-12 DIAGNOSIS — Z79.899 ENCOUNTER FOR LONG-TERM (CURRENT) USE OF MEDICATIONS: Primary | ICD-10-CM

## 2025-02-15 DIAGNOSIS — F41.1 GAD (GENERALIZED ANXIETY DISORDER): ICD-10-CM

## 2025-02-18 RX ORDER — MIRTAZAPINE 30 MG/1
30 TABLET, FILM COATED ORAL
Qty: 30 TABLET | Refills: 2 | Status: SHIPPED | OUTPATIENT
Start: 2025-02-18 | End: 2025-02-19 | Stop reason: SDUPTHER

## 2025-02-19 ENCOUNTER — OFFICE VISIT (OUTPATIENT)
Dept: PSYCHIATRY | Facility: CLINIC | Age: 60
End: 2025-02-19
Payer: COMMERCIAL

## 2025-02-19 DIAGNOSIS — F41.1 GAD (GENERALIZED ANXIETY DISORDER): ICD-10-CM

## 2025-02-19 DIAGNOSIS — F31.9 BIPOLAR 1 DISORDER, DEPRESSED (HCC): ICD-10-CM

## 2025-02-19 PROCEDURE — 99214 OFFICE O/P EST MOD 30 MIN: CPT

## 2025-02-19 RX ORDER — CARBAMAZEPINE 200 MG/1
200 TABLET ORAL 2 TIMES DAILY
Qty: 180 TABLET | Refills: 0 | Status: SHIPPED | OUTPATIENT
Start: 2025-02-19

## 2025-02-19 RX ORDER — DOXEPIN HYDROCHLORIDE 100 MG/1
200 CAPSULE ORAL
Qty: 180 CAPSULE | Refills: 0 | Status: SHIPPED | OUTPATIENT
Start: 2025-02-19

## 2025-02-19 RX ORDER — MIRTAZAPINE 30 MG/1
30 TABLET, FILM COATED ORAL
Qty: 90 TABLET | Refills: 0 | Status: SHIPPED | OUTPATIENT
Start: 2025-02-19

## 2025-02-19 RX ORDER — CLONAZEPAM 0.5 MG/1
0.5 TABLET ORAL 3 TIMES DAILY
Qty: 90 TABLET | Refills: 2 | Status: SHIPPED | OUTPATIENT
Start: 2025-02-19

## 2025-02-19 RX ORDER — TEMAZEPAM 30 MG/1
30 CAPSULE ORAL
Qty: 30 CAPSULE | Refills: 2 | Status: SHIPPED | OUTPATIENT
Start: 2025-02-19

## 2025-02-19 RX ORDER — ARIPIPRAZOLE 30 MG/1
30 TABLET ORAL DAILY
Qty: 90 TABLET | Refills: 0 | Status: SHIPPED | OUTPATIENT
Start: 2025-02-19

## 2025-02-19 NOTE — PSYCH
Psychiatric Medication Management - Behavioral Health   Mirella Mendoza 59 y.o. female MRN: [id number]          This note was not shared with the patient due to reasonable likelihood of causing patient harm     Asessment/Plan:   1. Continue Abilify 15 mg daily  2. Continue Tegretol 200 mg bid  3. Continue Doxepin 200 mg hs  4. Continue Klonopin 0.5 mg tid prn  5. Continue Remeron 30 mg hs   6. Continue Restoril 30 mg hs prn.  7. Follow up in 3 months     Diagnosis: Bipolar delmi, depressed, PABLO, Bereavement    Reason for Visit: Medication management    Subjective:   Patient is being treated for Bipolar Delmi, anxiety and insomnia. Patient is observed on Abilify 15 mg daily, Tegretol 200 mg bid, Doxepin 200 mg hs, Klonopin 0.5 mg tid prn, Remeron 30 mg hs and Restoril 30 mg hs prn. Patient tolerates the medications well, has been compliant and denies any side effects. Status quo. She denies depression and anxiety. Sleep and appetite remains normal. Good energy and motivation. Diabetes under control. She enjoys crocheting baby blankets. Patient SI/HI, AV/VH, elevated moods, panic attacks and paranoia.          Review Of Systems:     Constitutional Negative   ENT Negative    Cardiovascular High cholesterol   Respiratory Emphysema   Gastrointestinal Negative   Genitourinary Overactive bladder   Musculoskeletal Negative   Integumentary Negative   Neurological headaches   Endocrine IDDM type 2       Allergies: PCN, Amoxicillin, Benadryl    Past Psychiatric History: Dr. Lynn, [facility] at [facility]    Family Psychiatric History: anxiety, bipolar delmi    Social History: , lost 27 yo son 3 years ago    Substance Abuse History: pt denies     Traumatic History: loss of son 3 years ago    The following portions of the patient's history were reviewed and updated as appropriate: past family history, past medical history, past social history, past surgical history and problem list.      Mental  status:  Appearance Casually dressed   Mood stable   Affect pleasant   Speech WNL   Thought Processes WNL   Associations intact   Hallucinations Denies any auditory or visual hallucinations   Thought Content No passive or active suicidal or homicidal ideation, intent, or plan   Orientation Oriented to person, place, time, and situation   Recent and Remote Memory Grossly intact   Attention Span and Concentration intact   Intellect Appears to be of Average Intelligence   Insight intact   Judgement judgment was intact   Muscle Strength Normal gait   Language Within normal limits   Fund of Knowledge Age appropriate   Pain NA              Risks, Benefits And Possible Side Effects Of Medications:  Risks, benefits, and possible side effects of medications explained to patient and family, they verbalize understanding    Controlled Medication Discussion: Discussed with patient Black Box warning on concurrent use of benzodiazepines and opioid medications including sedation, respiratory depression, coma and death. Patient understands the risk of treatment with benzodiazepines in addition to opioids and wants to continue taking those medications.      Psychotherapy Provided: Supportive psychotherapy provided.     Yes  Reassurance and supportive therapy provided.       Visit Time    Visit Start Time: 1:00 PM  Visit Stop Time:  1:18 PM  Total Visit Duration: 18min

## 2025-03-18 ENCOUNTER — TELEPHONE (OUTPATIENT)
Age: 60
End: 2025-03-18

## 2025-03-18 NOTE — TELEPHONE ENCOUNTER
Returned Mirella's call.  Mirella reports that her sugars have been going up and down.  She said that after a bowl of cereal her sugar was 218.  Said she cannot use aspartame because it makes her sick but she really believes the Abilify is affecting her blood pressure and her sugar.  Also reports weight gain and a high A1C.  She is asking for something that won't affect her blood sugar and will help make her a little more calm.  Informed her that she may need to discuss this during an appointment but I will refer for provider recommendation.    Will refer to Brayan Romano for review.

## 2025-03-20 NOTE — TELEPHONE ENCOUNTER
LM on Mirella's VM informing her that provider will discuss other medication options/adjustments at next office visit.  Requested she call the office if she has any further questions.

## 2025-03-27 ENCOUNTER — TELEPHONE (OUTPATIENT)
Dept: PSYCHIATRY | Facility: CLINIC | Age: 60
End: 2025-03-27

## 2025-03-27 NOTE — TELEPHONE ENCOUNTER
Mirella called the office to report that she fell last night and banged her head.  States her  made her stay in that position until the ambulance arrived and took her to the hospital.  States she is ok.  She wanted to let Brayan know because she believes the Abilify has something to do with it.  She feels the Abilify affects her sugars and her blood pressure.  I told her it is difficult to say the Abilify caused the fall as it happened at night after taking Mirtazapine and Doxepin and waking up to go to the bathroom.  Asked if they checked her sugar in the hospital but they did not.  She said she checked it when she got home and it was 144 after only having a little water with ice.  Informed her I will update provider.  She has had her follow up moved to an earlier date.      Will refer to Brayan Romano for review.

## 2025-04-23 ENCOUNTER — OFFICE VISIT (OUTPATIENT)
Dept: PSYCHIATRY | Facility: CLINIC | Age: 60
End: 2025-04-23
Payer: COMMERCIAL

## 2025-04-23 DIAGNOSIS — F31.9 BIPOLAR 1 DISORDER, DEPRESSED (HCC): ICD-10-CM

## 2025-04-23 DIAGNOSIS — F41.1 GAD (GENERALIZED ANXIETY DISORDER): ICD-10-CM

## 2025-04-23 PROCEDURE — 99214 OFFICE O/P EST MOD 30 MIN: CPT

## 2025-04-23 RX ORDER — TEMAZEPAM 30 MG/1
30 CAPSULE ORAL
Qty: 30 CAPSULE | Refills: 2 | Status: SHIPPED | OUTPATIENT
Start: 2025-04-23

## 2025-04-23 RX ORDER — DOXEPIN HYDROCHLORIDE 100 MG/1
200 CAPSULE ORAL
Qty: 180 CAPSULE | Refills: 0 | Status: SHIPPED | OUTPATIENT
Start: 2025-04-23

## 2025-04-23 RX ORDER — CLONAZEPAM 0.5 MG/1
0.5 TABLET ORAL 3 TIMES DAILY
Qty: 90 TABLET | Refills: 2 | Status: SHIPPED | OUTPATIENT
Start: 2025-04-23

## 2025-04-23 RX ORDER — MIRTAZAPINE 30 MG/1
30 TABLET, FILM COATED ORAL
Qty: 90 TABLET | Refills: 0 | Status: SHIPPED | OUTPATIENT
Start: 2025-04-23

## 2025-04-23 RX ORDER — ARIPIPRAZOLE 10 MG/1
10 TABLET ORAL DAILY
Qty: 90 TABLET | Refills: 0 | Status: SHIPPED | OUTPATIENT
Start: 2025-04-23

## 2025-04-23 RX ORDER — CARBAMAZEPINE 200 MG/1
200 TABLET ORAL 2 TIMES DAILY
Qty: 180 TABLET | Refills: 0 | Status: SHIPPED | OUTPATIENT
Start: 2025-04-23

## 2025-04-23 RX ORDER — ARIPIPRAZOLE 30 MG/1
30 TABLET ORAL DAILY
Qty: 90 TABLET | Refills: 0 | Status: SHIPPED | OUTPATIENT
Start: 2025-04-23 | End: 2025-04-23
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
## 2025-05-27 DIAGNOSIS — F31.9 BIPOLAR 1 DISORDER, DEPRESSED (HCC): ICD-10-CM

## 2025-05-27 DIAGNOSIS — F41.1 GAD (GENERALIZED ANXIETY DISORDER): ICD-10-CM

## 2025-05-27 NOTE — TELEPHONE ENCOUNTER
Medication Refill Request     Name of Medication Abilify  Dose/Frequency 10mg  Quantity 90 tab  Verified pharmacy   [x]  Verified ordering Provider   [x]  Does patient have enough for the next 3 days? Yes [x] No []  Does patient have a follow-up appointment scheduled? Yes [x] No []   If so when is appointment: 7/23 at 2    Medication Refill Request     Name of Medication Tegretol  Dose/Frequency 200mg  Quantity 180 tab  Verified pharmacy   [x]  Verified ordering Provider   [x]  Does patient have enough for the next 3 days? Yes [x] No []  Does patient have a follow-up appointment scheduled? Yes [x] No []   If so when is appointment: 7/23 at 2    Medication Refill Request     Name of Medication Klonopin  Dose/Frequency 0.5mg  Quantity 90tab  Verified pharmacy   [x]  Verified ordering Provider   [x]  Does patient have enough for the next 3 days? Yes [x] No []  Does patient have a follow-up appointment scheduled? Yes [x] No []   If so when is appointment: 7/23 at 2  Medication Refill Request     Name of Medication Sinequan  Dose/Frequency 100mg  Quantity 180cap  Verified pharmacy   [x]  Verified ordering Provider   [x]  Does patient have enough for the next 3 days? Yes [x] No []  Does patient have a follow-up appointment scheduled? Yes [x] No []   If so when is appointment: 7/23 at 2    Medication Refill Request     Name of Medication Remeron  Dose/Frequency 30mg  Quantity 90 tab  Verified pharmacy   [x]  Verified ordering Provider   [x]  Does patient have enough for the next 3 days? Yes [x] No []  Does patient have a follow-up appointment scheduled? Yes [x] No []   If so when is appointment: 7/23 at 2

## 2025-05-27 NOTE — TELEPHONE ENCOUNTER
Patient was saying that her refills were taken from [facility] by [facility] to be sent to her home but she didn't give her ID of fill out the forms. I wasn't really understanding how that would happen, tried to explain to the patient I wasn't sure what that was about but then and when I asked the patient what medication she needed she hung up

## 2025-05-28 DIAGNOSIS — F41.1 GAD (GENERALIZED ANXIETY DISORDER): ICD-10-CM

## 2025-05-28 RX ORDER — CLONAZEPAM 0.5 MG/1
0.5 TABLET ORAL 3 TIMES DAILY
Qty: 90 TABLET | Refills: 2 | Status: SHIPPED | OUTPATIENT
Start: 2025-05-28

## 2025-05-28 RX ORDER — ARIPIPRAZOLE 10 MG/1
10 TABLET ORAL DAILY
Qty: 90 TABLET | Refills: 0 | Status: SHIPPED | OUTPATIENT
Start: 2025-05-28

## 2025-05-28 RX ORDER — CARBAMAZEPINE 200 MG/1
200 TABLET ORAL 2 TIMES DAILY
Qty: 180 TABLET | Refills: 0 | Status: SHIPPED | OUTPATIENT
Start: 2025-05-28

## 2025-05-28 RX ORDER — MIRTAZAPINE 30 MG/1
30 TABLET, FILM COATED ORAL
Qty: 90 TABLET | Refills: 0 | Status: SHIPPED | OUTPATIENT
Start: 2025-05-28

## 2025-05-29 NOTE — TELEPHONE ENCOUNTER
Patient called to report she is still waiting for temazepam, advised pending approval. She noted the bottle has refills on form last order 4/29/25 but [facility] will not fill and told her new orders needed.  She has none left.

## 2025-05-29 NOTE — TELEPHONE ENCOUNTER
Patient called to request a refill for their Temazepam advised a refill was requested on 05/28/25 and is pending approval. Patient verbalized understanding and is in agreement.     Does the patient have enough for 3 days?   [] Yes   [x] No - Send as HP to POD

## 2025-05-30 RX ORDER — TEMAZEPAM 30 MG/1
30 CAPSULE ORAL
Qty: 30 CAPSULE | Refills: 2 | Status: SHIPPED | OUTPATIENT
Start: 2025-05-30

## 2025-07-23 ENCOUNTER — OFFICE VISIT (OUTPATIENT)
Dept: PSYCHIATRY | Facility: CLINIC | Age: 60
End: 2025-07-23
Payer: COMMERCIAL

## 2025-07-23 DIAGNOSIS — F31.9 BIPOLAR 1 DISORDER, DEPRESSED (HCC): ICD-10-CM

## 2025-07-23 DIAGNOSIS — F41.1 GAD (GENERALIZED ANXIETY DISORDER): ICD-10-CM

## 2025-07-23 PROCEDURE — 99214 OFFICE O/P EST MOD 30 MIN: CPT

## 2025-07-23 RX ORDER — CLONAZEPAM 0.5 MG/1
0.5 TABLET ORAL 3 TIMES DAILY
Qty: 90 TABLET | Refills: 2 | Status: SHIPPED | OUTPATIENT
Start: 2025-07-23

## 2025-07-23 RX ORDER — TEMAZEPAM 30 MG/1
30 CAPSULE ORAL
Qty: 30 CAPSULE | Refills: 2 | Status: SHIPPED | OUTPATIENT
Start: 2025-07-23

## 2025-07-23 RX ORDER — ARIPIPRAZOLE 10 MG/1
10 TABLET ORAL DAILY
Qty: 90 TABLET | Refills: 0 | Status: SHIPPED | OUTPATIENT
Start: 2025-07-23

## 2025-07-23 RX ORDER — MIRTAZAPINE 30 MG/1
30 TABLET, FILM COATED ORAL
Qty: 90 TABLET | Refills: 0 | Status: SHIPPED | OUTPATIENT
Start: 2025-07-23

## 2025-07-23 RX ORDER — DOXEPIN HYDROCHLORIDE 100 MG/1
200 CAPSULE ORAL
Qty: 180 CAPSULE | Refills: 0 | Status: SHIPPED | OUTPATIENT
Start: 2025-07-23

## 2025-07-23 RX ORDER — CARBAMAZEPINE 200 MG/1
200 TABLET ORAL 2 TIMES DAILY
Qty: 180 TABLET | Refills: 0 | Status: SHIPPED | OUTPATIENT
Start: 2025-07-23

## 2025-07-23 NOTE — PSYCH
Psychiatric Medication Management - Behavioral Health   Mirella Mendoza 60 y.o. female MRN: [id number]        Asessment/Plan:   1. Continue Abilify to 10 mg daily  2. Continue Tegretol 200 mg bid  3. Continue Doxepin 200 mg hs  4. Continue Klonopin 0.5 mg tid prn  5. Continue Remeron 30 mg hs   6. Continue Restoril 30 mg hs prn.  7. Follow up in 3 months     Diagnosis: Bipolar delmi, depressed, PABLO    Reason for Visit: Medication management    Subjective:   Patient is being treated for Bipolar Delmi, anxiety and insomnia. Patient is observed on Abilify 15 mg daily, Tegretol 200 mg bid, Doxepin 200 mg hs, Klonopin 0.5 mg tid prn, Remeron 30 mg hs and Restoril 30 mg hs prn. Patient tolerates the medications well, has been compliant and denies any side effects. Patient reports doing much better since reduction in abilify. Patient feels less sedated during the day in addition to clearer speech. Mood has been stable. She denies AH/VH. Sleep and appetite is adequate. Good energy and motivation. She denies si/hi.         Review Of Systems:     Constitutional Negative   ENT Negative    Cardiovascular High cholesterol   Respiratory Emphysema   Gastrointestinal Negative   Genitourinary Overactive bladder   Musculoskeletal Negative   Integumentary Negative   Neurological headaches   Endocrine IDDM type 2       Allergies: PCN, Amoxicillin, Benadryl    Past Psychiatric History: Dr. Lynn, [facility] at [facility]    Family Psychiatric History: anxiety, bipolar delmi    Social History: , lost 27 yo son 3 years ago    Substance Abuse History: pt denies     Traumatic History: loss of son 3 years ago    The following portions of the patient's history were reviewed and updated as appropriate: past family history, past medical history, past social history, past surgical history and problem list.      Mental status:  Appearance Casually dressed   Mood stable   Affect pleasant   Speech WNL   Thought Processes WNL    Associations intact   Hallucinations Denies any auditory or visual hallucinations   Thought Content No passive or active suicidal or homicidal ideation, intent, or plan   Orientation Oriented to person, place, time, and situation   Recent and Remote Memory Grossly intact   Attention Span and Concentration intact   Intellect Appears to be of Average Intelligence   Insight intact   Judgement judgment was intact   Muscle Strength Normal gait   Language Within normal limits   Fund of Knowledge Age appropriate   Pain NA              Risks, Benefits And Possible Side Effects Of Medications:  Risks, benefits, and possible side effects of medications explained to patient and family, they verbalize understanding    Controlled Medication Discussion: Discussed with patient Black Box warning on concurrent use of benzodiazepines and opioid medications including sedation, respiratory depression, coma and death. Patient understands the risk of treatment with benzodiazepines in addition to opioids and wants to continue taking those medications.      Psychotherapy Provided: Supportive psychotherapy provided.     Yes  Reassurance and supportive therapy provided.       Visit Time    Visit Start Time: 2:00 PM  Visit Stop Time:  2:17 PM  Total Visit Duration: 17min